# Patient Record
Sex: FEMALE | Race: BLACK OR AFRICAN AMERICAN
[De-identification: names, ages, dates, MRNs, and addresses within clinical notes are randomized per-mention and may not be internally consistent; named-entity substitution may affect disease eponyms.]

---

## 2017-10-04 ENCOUNTER — HOSPITAL ENCOUNTER (OUTPATIENT)
Dept: HOSPITAL 57 - BURRAD | Age: 77
Discharge: HOME | End: 2017-10-04
Attending: FAMILY MEDICINE
Payer: MEDICARE

## 2017-10-04 DIAGNOSIS — M79.671: Primary | ICD-10-CM

## 2017-10-04 NOTE — RAD
RIGHT FOOT THREE VIEWS

10/4/17

 

No fracture or other acute bony change was seen. All bones appear intact. There is some soft tissue 
swelling on the dorsum of the forefoot. A prominent calcaneal spur is noted.

 

IMPRESSION:  

No acute bony finding. 

 

POS: HOME

## 2017-11-15 ENCOUNTER — HOSPITAL ENCOUNTER (EMERGENCY)
Dept: HOSPITAL 57 - BURERS | Age: 77
Discharge: HOME | End: 2017-11-15
Payer: MEDICARE

## 2017-11-15 DIAGNOSIS — E03.9: ICD-10-CM

## 2017-11-15 DIAGNOSIS — E78.5: ICD-10-CM

## 2017-11-15 DIAGNOSIS — E11.9: ICD-10-CM

## 2017-11-15 DIAGNOSIS — F32.9: ICD-10-CM

## 2017-11-15 DIAGNOSIS — S13.9XXA: Primary | ICD-10-CM

## 2017-11-15 DIAGNOSIS — Z79.899: ICD-10-CM

## 2017-11-15 DIAGNOSIS — Z79.82: ICD-10-CM

## 2017-11-15 DIAGNOSIS — Z87.891: ICD-10-CM

## 2017-11-15 DIAGNOSIS — I10: ICD-10-CM
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
## 2017-11-15 DIAGNOSIS — Z79.4: ICD-10-CM

## 2017-11-15 DIAGNOSIS — K21.9: ICD-10-CM

## 2017-11-15 PROCEDURE — 99283 EMERGENCY DEPT VISIT LOW MDM: CPT

## 2018-08-13 ENCOUNTER — HOSPITAL ENCOUNTER (OUTPATIENT)
Dept: HOSPITAL 92 - BICMAMMO | Age: 78
Discharge: HOME | End: 2018-08-13
Payer: MEDICARE

## 2018-08-13 DIAGNOSIS — Z80.3: ICD-10-CM

## 2018-08-13 DIAGNOSIS — Z12.31: Primary | ICD-10-CM

## 2018-08-13 DIAGNOSIS — R92.1: ICD-10-CM

## 2018-08-13 PROCEDURE — 77067 SCR MAMMO BI INCL CAD: CPT

## 2018-08-13 PROCEDURE — 77063 BREAST TOMOSYNTHESIS BI: CPT

## 2018-12-10 ENCOUNTER — HOSPITAL ENCOUNTER (EMERGENCY)
Dept: HOSPITAL 57 - BURERS | Age: 78
Discharge: HOME | End: 2018-12-10
Payer: MEDICARE

## 2018-12-10 DIAGNOSIS — Z87.891: ICD-10-CM
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
## 2018-12-10 DIAGNOSIS — K21.9: ICD-10-CM

## 2018-12-10 DIAGNOSIS — F32.9: ICD-10-CM

## 2018-12-10 DIAGNOSIS — S13.9XXA: Primary | ICD-10-CM

## 2018-12-10 DIAGNOSIS — E78.5: ICD-10-CM

## 2018-12-10 DIAGNOSIS — I10: ICD-10-CM

## 2018-12-10 DIAGNOSIS — E11.9: ICD-10-CM

## 2018-12-10 DIAGNOSIS — E66.9: ICD-10-CM

## 2018-12-10 DIAGNOSIS — E03.9: ICD-10-CM

## 2018-12-10 PROCEDURE — 99283 EMERGENCY DEPT VISIT LOW MDM: CPT

## 2019-05-16 ENCOUNTER — HOSPITAL ENCOUNTER (OUTPATIENT)
Dept: HOSPITAL 92 - BICULT | Age: 79
Discharge: HOME | End: 2019-05-16
Attending: INTERNAL MEDICINE
Payer: MEDICARE

## 2019-05-16 DIAGNOSIS — E04.2: Primary | ICD-10-CM

## 2019-05-16 PROCEDURE — 76536 US EXAM OF HEAD AND NECK: CPT

## 2019-05-16 NOTE — ULT
US Thyroid STANDARD



History: [Follow-up thyroid ultrasound from 2016, multinodular goiter]



Comparison: Ultrasound thyroid December 2016



Findings: Real-time grayscale and color evaluation of the thyroid was performed.



The isthmus measures 4 mm in AP dimension. Right lobe measures 4.8 x 1.7 x 1.6 cm and left measures 4
.6 x 1.5 x 1.7 cm.



4 mm nodule to the left lobe of the thyroid superior pole is unchanged.



Impression: Unchanged subcentimeter nodule left lobe of thyroid does not require follow-up per TIRADS
 criteria.



Reported By: Fabrice Duval 

Electronically Signed:  5/16/2019 11:14 AM

## 2019-08-22 ENCOUNTER — HOSPITAL ENCOUNTER (OUTPATIENT)
Dept: HOSPITAL 92 - BICMAMMO | Age: 79
Discharge: HOME | End: 2019-08-22
Payer: MEDICARE

## 2019-08-22 DIAGNOSIS — Z12.31: Primary | ICD-10-CM

## 2019-08-22 PROCEDURE — 77063 BREAST TOMOSYNTHESIS BI: CPT

## 2019-08-22 PROCEDURE — 77067 SCR MAMMO BI INCL CAD: CPT

## 2019-08-22 NOTE — MMO
Bilateral MAMMO Bilat Screen [facility].

 

CLINICAL HISTORY:

Patient is 79 years old and is seen for screening. The patient has no family

history of breast cancer.  The patient has no personal history of cancer.

 

VIEWS:

The views performed were:  bilateral craniocaudal with tomosynthesis and

bilateral mediolateral oblique with tomosynthesis.

 

FILMS COMPARED:

The present examination has been compared to prior imaging studies performed at

[facility] on 07/09/2015, 08/09/2016, 08/10/2017 and 08/13/2018.

 

MAMMOGRAM FINDINGS:

There are scattered fibroglandular densities.

 

There are no suspicious masses, suspicious calcifications, or new areas of

architectural distortion.

 

IMPRESSION:

THERE IS NO MAMMOGRAPHIC EVIDENCE OF MALIGNANCY.

 

A ROUTINE FOLLOW-UP MAMMOGRAM IN 1 YEAR IS RECOMMENDED.

 

THE RESULTS OF THIS EXAM WERE SENT TO THE PATIENT.

 

ACR BI-RADS Category 1 - Negative

 

MAMMOGRAPHY NOTE:

 1. A negative mammogram report should not delay a biopsy if a dominant of

 clinically suspicious mass is present.

 2. Approximately 10% to 15% of breast cancers are not detected by

 mammography.

 3. Adenosis and dense breasts may obscure an underlying neoplasm.

 

 

Reported by: KIRK CASANOVA MD

Electonically Signed: [id number]

## 2020-01-20 ENCOUNTER — HOSPITAL ENCOUNTER (EMERGENCY)
Dept: HOSPITAL 57 - BURERS | Age: 80
Discharge: HOME | End: 2020-01-20
Payer: MEDICARE

## 2020-01-20 DIAGNOSIS — I10: ICD-10-CM

## 2020-01-20 DIAGNOSIS — L73.9: Primary | ICD-10-CM

## 2020-01-20 DIAGNOSIS — Z79.84: ICD-10-CM

## 2020-01-20 DIAGNOSIS — F32.9: ICD-10-CM

## 2020-01-20 DIAGNOSIS — E11.9: ICD-10-CM

## 2020-01-20 DIAGNOSIS — E03.9: ICD-10-CM

## 2020-01-20 DIAGNOSIS — Z79.82: ICD-10-CM

## 2020-01-20 DIAGNOSIS — E78.5: ICD-10-CM

## 2020-01-20 DIAGNOSIS — K21.9: ICD-10-CM

## 2020-01-20 DIAGNOSIS — L50.9: ICD-10-CM

## 2020-01-20 DIAGNOSIS — Z79.899: ICD-10-CM

## 2020-01-20 DIAGNOSIS — Z87.891: ICD-10-CM

## 2020-01-20 PROCEDURE — 99282 EMERGENCY DEPT VISIT SF MDM: CPT

## 2020-03-14 ENCOUNTER — HOSPITAL ENCOUNTER (EMERGENCY)
Dept: HOSPITAL 57 - BURERS | Age: 80
Discharge: HOME | End: 2020-03-14
Payer: MEDICARE
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
## 2020-03-14 DIAGNOSIS — F32.9: ICD-10-CM

## 2020-03-14 DIAGNOSIS — I10: ICD-10-CM

## 2020-03-14 DIAGNOSIS — E66.9: ICD-10-CM

## 2020-03-14 DIAGNOSIS — E11.9: ICD-10-CM

## 2020-03-14 DIAGNOSIS — E78.5: ICD-10-CM

## 2020-03-14 DIAGNOSIS — K21.9: ICD-10-CM

## 2020-03-14 DIAGNOSIS — E03.9: ICD-10-CM

## 2020-03-14 DIAGNOSIS — Z87.891: ICD-10-CM

## 2020-03-14 DIAGNOSIS — S80.12XA: Primary | ICD-10-CM

## 2020-03-14 NOTE — RAD
Left lower leg 2 views



HISTORY: Leg pain. Lump..





FINDINGS: Tibia and fibula are intact the medial and lateral malleoli are excluded from the frontal v
iew. Marker was placed over the anterior aspect of the leg in region of palpable concern.



No acute fracture, dislocation, or aggressive osseous erosions, or radiopaque foreign bodies are appa
rent.











IMPRESSION: No significant abnormalities are demonstrated.



Reported By: CHET Singer 

Electronically Signed:  3/14/2020 2:09 PM

## 2021-10-07 ENCOUNTER — HOSPITAL ENCOUNTER (EMERGENCY)
Dept: HOSPITAL 57 - BURERS | Age: 81
Discharge: HOME | End: 2021-10-07
Payer: MEDICARE

## 2021-10-07 DIAGNOSIS — I10: ICD-10-CM

## 2021-10-07 DIAGNOSIS — K21.9: ICD-10-CM

## 2021-10-07 DIAGNOSIS — Z87.891: ICD-10-CM

## 2021-10-07 DIAGNOSIS — E11.9: ICD-10-CM

## 2021-10-07 DIAGNOSIS — E78.5: ICD-10-CM

## 2021-10-07 DIAGNOSIS — R51.9: Primary | ICD-10-CM

## 2021-10-07 DIAGNOSIS — E66.9: ICD-10-CM

## 2021-10-07 DIAGNOSIS — E03.9: ICD-10-CM

## 2021-10-07 PROCEDURE — 70450 CT HEAD/BRAIN W/O DYE: CPT

## 2023-05-06 ENCOUNTER — HOSPITAL ENCOUNTER (EMERGENCY)
Dept: HOSPITAL 57 - BURERS | Age: 83
Discharge: HOME | End: 2023-05-06
Payer: COMMERCIAL

## 2023-05-06 DIAGNOSIS — Z79.4: ICD-10-CM

## 2023-05-06 DIAGNOSIS — I10: ICD-10-CM

## 2023-05-06 DIAGNOSIS — Z79.899: ICD-10-CM

## 2023-05-06 DIAGNOSIS — Z87.891: ICD-10-CM

## 2023-05-06 DIAGNOSIS — K21.9: ICD-10-CM

## 2023-05-06 DIAGNOSIS — E66.9: ICD-10-CM

## 2023-05-06 DIAGNOSIS — N18.30: ICD-10-CM

## 2023-05-06 DIAGNOSIS — Z79.82: ICD-10-CM

## 2023-05-06 DIAGNOSIS — E11.22: ICD-10-CM

## 2023-05-06 DIAGNOSIS — E03.9: ICD-10-CM

## 2023-05-06 DIAGNOSIS — E78.5: ICD-10-CM

## 2023-05-06 DIAGNOSIS — N39.0: Primary | ICD-10-CM

## 2023-05-06 DIAGNOSIS — Z79.84: ICD-10-CM

## 2023-05-06 LAB
ANION GAP SERPL CALC-SCNC: 14 MMOL/L (ref 10–20)
BUN SERPL-MCNC: 15 MG/DL (ref 9.8–20.1)
CALCIUM SERPL-MCNC: 8.8 MG/DL (ref 7.8–10.44)
CHLORIDE SERPL-SCNC: 105 MMOL/L (ref 98–107)
CO2 SERPL-SCNC: 21 MMOL/L (ref 23–31)
CREAT CL PREDICTED SERPL C-G-VRATE: 0 ML/MIN (ref 70–130)
GLUCOSE SERPL-MCNC: 138 MG/DL (ref 83–110)
HGB BLD-MCNC: 11.8 G/DL (ref 12–16)
MCH RBC QN AUTO: 27.8 PG (ref 27–31)
MCV RBC AUTO: 89.8 FL (ref 78–98)
MDIFF COMPLETE?: YES
PLATELET # BLD AUTO: 229 10X3/UL (ref 130–400)
POTASSIUM SERPL-SCNC: 3.7 MMOL/L (ref 3.5–5.1)
RBC # BLD AUTO: 4.25 MILL/UL (ref 4.2–5.4)
SODIUM SERPL-SCNC: 136 MMOL/L (ref 136–145)
WBC # BLD AUTO: 8.8 10X3/UL (ref 4.8–10.8)

## 2023-05-06 PROCEDURE — 71045 X-RAY EXAM CHEST 1 VIEW: CPT

## 2023-05-06 PROCEDURE — 36415 COLL VENOUS BLD VENIPUNCTURE: CPT

## 2023-05-06 PROCEDURE — 87040 BLOOD CULTURE FOR BACTERIA: CPT

## 2023-05-06 PROCEDURE — 83605 ASSAY OF LACTIC ACID: CPT

## 2023-05-06 PROCEDURE — 87430 STREP A AG IA: CPT

## 2023-05-06 PROCEDURE — 85025 COMPLETE CBC W/AUTO DIFF WBC: CPT

## 2023-05-06 PROCEDURE — 87804 INFLUENZA ASSAY W/OPTIC: CPT

## 2023-05-06 PROCEDURE — 87807 RSV ASSAY W/OPTIC: CPT

## 2023-05-06 PROCEDURE — U0003 INFECTIOUS AGENT DETECTION BY NUCLEIC ACID (DNA OR RNA); SEVERE ACUTE RESPIRATORY SYNDROME CORONAVIRUS 2 (SARS-COV-2) (CORONAVIRUS DISEASE [COVID-19]), AMPLIFIED PROBE TECHNIQUE, MAKING USE OF HIGH THROUGHPUT TECHNOLOGIES AS DESCRIBED BY CMS-2020-01-R: HCPCS

## 2023-05-06 PROCEDURE — U0005 INFEC AGEN DETEC AMPLI PROBE: HCPCS

## 2023-05-06 PROCEDURE — 87081 CULTURE SCREEN ONLY: CPT

## 2023-05-06 PROCEDURE — 80048 BASIC METABOLIC PNL TOTAL CA: CPT

## 2024-10-01 ENCOUNTER — HOSPITAL ENCOUNTER (OUTPATIENT)
Dept: HOSPITAL 57 - BURRAD | Age: 84
Discharge: HOME | End: 2024-10-01
Attending: STUDENT IN AN ORGANIZED HEALTH CARE EDUCATION/TRAINING PROGRAM
Payer: COMMERCIAL

## 2024-10-01 DIAGNOSIS — R09.89: Primary | ICD-10-CM

## 2024-10-01 PROCEDURE — 71046 X-RAY EXAM CHEST 2 VIEWS: CPT
